# Patient Record
Sex: MALE | Race: WHITE
[De-identification: names, ages, dates, MRNs, and addresses within clinical notes are randomized per-mention and may not be internally consistent; named-entity substitution may affect disease eponyms.]

---

## 2017-05-07 ENCOUNTER — HOSPITAL ENCOUNTER (EMERGENCY)
Dept: HOSPITAL 25 - ED | Age: 27
Discharge: HOME | End: 2017-05-07
Payer: COMMERCIAL

## 2017-05-07 VITALS — DIASTOLIC BLOOD PRESSURE: 65 MMHG | SYSTOLIC BLOOD PRESSURE: 135 MMHG

## 2017-05-07 DIAGNOSIS — H11.421: Primary | ICD-10-CM

## 2017-05-07 PROCEDURE — 99281 EMR DPT VST MAYX REQ PHY/QHP: CPT

## 2017-05-07 NOTE — ED
Melvin CROCKETT Billy, scribed for Abel Thayer MD on 05/07/17 at 1259 .





Complex/Multi-Sys Presentation





- HPI Summary


HPI Summary: 


Patient is a 27 year-old male coming to [facility] for evaluation of itching to his 

right eye since this morning. He states that he was watching television when 

the symptoms began. He rubbed it due to the itch, and now his right eye is 

swelling and tearing. Denies any vision changes. He denies any obvious signs of 

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 He 

does not wear contact lenses. Patient has a history of seasonal allergies.





- History Of Current Complaint


Chief Complaint: EDEyeProblem


Time Seen by Provider: 05/07/17 12:51


Hx Obtained From: Patient


Onset/Duration: Sudden Onset, Lasting Hours, Still Present


Timing: Constant


Severity Currently: Moderate


Severity Initially: Moderate


Location: Pain At: - right eye


Aggravating Factor(s): none


Alleviating Factor(s): none





- Allergies/Home Medications


Allergies/Adverse Reactions: 


 Allergies











Allergy/AdvReac Type Severity Reaction Status Date / Time


 


No Known Allergies Allergy   Verified 05/07/17 13:23














PMH/Surg Hx/FS Hx/Imm Hx


Endocrine/Hematology History: 


   Denies: Hx Diabetes


Cardiovascular History: 


   Denies: Hx Coronary Artery Disease


Infectious Disease History: No


Infectious Disease History: 


   Denies: Traveled Outside the [place] in Last 30 Days





- Family History


Known Family History: 


   Negative: Cardiac Disease, Hypertension, Diabetes





- Social History


Alcohol Use: None


Hx Substance Use: No


Substance Use Type: Reports: None


Hx Tobacco Use: Yes


Type: Smokeless Tobacco





Review of Systems


Negative: Fever


Eyes: Other - right eye irritation


All Other Systems Reviewed And Are Negative: Yes





Physical Exam


Triage Information Reviewed: Yes


Vital Signs On Initial Exam: 


 Initial Vitals











Temp Pulse Resp BP Pulse Ox


 


 98.9 F   72   16   135/65   99 


 


 05/07/17 12:37  05/07/17 12:37  05/07/17 12:37  05/07/17 12:37  05/07/17 12:37











Vital Signs Reviewed: Yes


Appearance: Positive: Well-Appearing, No Pain Distress


Skin: Positive: Warm, Skin Color Reflects Adequate Perfusion, Dry


Head/Face: Positive: Normal Head/Face Inspection


Eyes: Positive: EOMI, ISAEL, Other: - In the right eye, there is conjunctival 

swelling of the lateral and inferior aspect which extends to the border of the 

iris. No FB or corneal abrasions seen with fluorescein dye.


ENT: Positive: Normal ENT inspection


Neck: Positive: Supple, Nontender


Musculoskeletal: Positive: Normal, Strength/ROM Intact


Neurological: Positive: Normal, Sensory/Motor Intact, Alert, Oriented to Person 

Place, Time


Psychiatric: Positive: Affect/Mood Appropriate





Diagnostics





- Vital Signs


 Vital Signs











  Temp Pulse Resp BP Pulse Ox


 


 05/07/17 12:37  98.9 F  72  16  135/65  99














- Laboratory


Lab Statement: Any lab studies that have been ordered have been reviewed, and 

results considered in the medical decision making process.





Complex Multi-Symp Course/Dx


Course Of Treatment: NO CRITICAL CARE TIME


Assessment/Plan: NO KNOWN FB. NO EVIDENCE OF PENETRATING INJURY. RX TOBREX AND 

KETOTIFEN. F/U WITH OPHTHALMOLOGY IF NOT IMPROVED. DISCHARGE HOME STABLE.





- Diagnoses


Provider Diagnoses: 


 Chemosis of right conjunctiva








Discharge





- Discharge Plan


Condition: Stable


Disposition: HOME


Prescriptions: 


Ketotifen Fumarate (Ophth) [Allergy Eye Drops] 1 drop OP BID PRN #1 bottle


 PRN Reason: Allergy Symptoms


Patient Education Materials:  Conjunctivitis (ED)


Referrals: 


No Primary Care Phys,NOPCP [Primary Care Provider] - 


[facility] [Provider Group]


DUANE JADE MD [Provider Group]


Additional Instructions: 


FOLLOW UP WITH OPHTHALMOLOGY IN 1-2 DAYS IF NOT IMPROVED OR WORSE.


RETURN TO THE EMERGENCY DEPARTMENT FOR ANY WORSENING OF YOUR CONDITION OR 

QUESTIONS OR CONCERNS.





The documentation as recorded by the Melvin singh Billy accurately reflects the 

service I personally performed and the decisions made by me, Abel Thayer MD.

## 2018-03-04 ENCOUNTER — HOSPITAL ENCOUNTER (EMERGENCY)
Dept: HOSPITAL 25 - ED | Age: 28
Discharge: ANOTHER HOSPITAL | End: 2018-03-04
Payer: COMMERCIAL

## 2018-03-04 VITALS — DIASTOLIC BLOOD PRESSURE: 68 MMHG | SYSTOLIC BLOOD PRESSURE: 122 MMHG
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
## 2018-03-04 DIAGNOSIS — S73.015A: Primary | ICD-10-CM
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
## 2018-03-04 LAB
BASOPHILS # BLD AUTO: 0.1 10^3/UL (ref 0–0.2)
EOSINOPHIL # BLD AUTO: 0 10^3/UL (ref 0–0.6)
HCT VFR BLD AUTO: 45 % (ref 42–52)
HGB BLD-MCNC: 15.5 G/DL (ref 14–18)
INR PPP/BLD: 0.87 (ref 0.77–1.02)
LYMPHOCYTES # BLD AUTO: 1.1 10^3/UL (ref 1–4.8)
MCH RBC QN AUTO: 31 PG (ref 27–31)
MCHC RBC AUTO-ENTMCNC: 35 G/DL (ref 31–36)
MCV RBC AUTO: 88 FL (ref 80–94)
MONOCYTES # BLD AUTO: 0.4 10^3/UL (ref 0–0.8)
NEUTROPHILS # BLD AUTO: 8.6 10^3/UL (ref 1.5–7.7)
NRBC # BLD AUTO: 0 10^3/UL
NRBC BLD QL AUTO: 0
PLATELET # BLD AUTO: 235 10^3/UL (ref 150–450)
RBC # BLD AUTO: 5.07 10^6/UL (ref 4–5.4)
WBC # BLD AUTO: 10.3 10^3/UL (ref 3.5–10.8)

## 2018-03-04 PROCEDURE — 85025 COMPLETE CBC W/AUTO DIFF WBC: CPT

## 2018-03-04 PROCEDURE — 71250 CT THORAX DX C-: CPT

## 2018-03-04 PROCEDURE — 83605 ASSAY OF LACTIC ACID: CPT

## 2018-03-04 PROCEDURE — G0480 DRUG TEST DEF 1-7 CLASSES: HCPCS

## 2018-03-04 PROCEDURE — 99285 EMERGENCY DEPT VISIT HI MDM: CPT

## 2018-03-04 PROCEDURE — 80320 DRUG SCREEN QUANTALCOHOLS: CPT

## 2018-03-04 PROCEDURE — 36415 COLL VENOUS BLD VENIPUNCTURE: CPT

## 2018-03-04 PROCEDURE — 74176 CT ABD & PELVIS W/O CONTRAST: CPT

## 2018-03-04 PROCEDURE — 85610 PROTHROMBIN TIME: CPT

## 2018-03-04 PROCEDURE — 96374 THER/PROPH/DIAG INJ IV PUSH: CPT

## 2018-03-04 PROCEDURE — 96375 TX/PRO/DX INJ NEW DRUG ADDON: CPT

## 2018-03-04 PROCEDURE — 70450 CT HEAD/BRAIN W/O DYE: CPT

## 2018-03-04 PROCEDURE — 80053 COMPREHEN METABOLIC PANEL: CPT
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
## 2018-08-02 ENCOUNTER — HOSPITAL ENCOUNTER (EMERGENCY)
Dept: HOSPITAL 25 - ED | Age: 28
Discharge: HOME | End: 2018-08-02
Payer: COMMERCIAL

## 2018-08-02 DIAGNOSIS — H10.12: ICD-10-CM
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
## 2018-08-02 DIAGNOSIS — J30.2: ICD-10-CM

## 2018-08-02 DIAGNOSIS — S05.02XA: Primary | ICD-10-CM

## 2018-08-02 PROCEDURE — 99281 EMR DPT VST MAYX REQ PHY/QHP: CPT
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
## 2018-08-02 NOTE — ED
Throat Pain/Nasal Congestion





- HPI Summary


HPI Summary: 


Pt. is a 28 y.o male who presents to the ER for left eye irritation x 2 days. 

Pt. states he noticed his left eye was itching, red and draining 2 days ago. He 

saw his PCP and was started on gentamycin drops. Pt. states his PCP thought 

there may be something in his eye but was able to remove it. Pt. does not wear 

contact lenses. Rubbing eye and blinking makes symptoms worse. Nothing makes 

symptoms better. Pt. has a hx of seasonal allergies, but is not currently 

taking an antihistamine. Pt. otherwise denies recent illness. Denies visual 

changes. 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 

when he was at home. Pt. presents to the ER today because symptoms are not 

improving.








- History of Current Complaint


Chief Complaint: EDEyeProblem


Time Seen by Provider: 08/02/18 06:24


Hx Obtained From: Patient





- Allergies/Home Medications


Allergies/Adverse Reactions: 


 Allergies











Allergy/AdvReac Type Severity Reaction Status Date / Time


 


No Known Allergies Allergy   Verified 03/04/18 19:50














PMH/Surg Hx/FS Hx/Imm Hx


Previously Healthy: Yes


Endocrine/Hematology History: 


   Denies: Hx Diabetes


Cardiovascular History: 


   Denies: Hx Coronary Artery Disease


Infectious Disease History: No


Infectious Disease History: 


   Denies: Traveled Outside the [place] in Last 30 Days





- Family History


Known Family History: 


   Negative: Cardiac Disease, Hypertension, Diabetes





- Social History


Occupation: Employed Full-time


Lives: With Family


Alcohol Use: Occasionally


Hx Substance Use: No


Substance Use Type: Reports: None


Hx Tobacco Use: Yes


Smoking Status (MU): Never Smoked Tobacco


Type: Smokeless Tobacco





Review of Systems


Constitutional: Negative


Positive: Drainage, Erythema


ENT: Negative


Cardiovascular: Negative


Respiratory: Negative


Skin: Negative


Neurological: Negative


All Other Systems Reviewed And Are Negative: Yes





Physical Exam


Triage Information Reviewed: Yes


Vital Signs On Initial Exam: 


 Initial Vitals











Temp Pulse Resp BP Pulse Ox


 


 97.6 F   48   16   156/106   100 


 


 08/02/18 06:19  08/02/18 06:19  08/02/18 06:19  08/02/18 06:19  08/02/18 06:19











Vital Signs Reviewed: Yes


Appearance: Positive: Well-Appearing - Pt. sitting on bed in NAD.


Skin: Positive: Warm, Dry


Head/Face: Positive: Normal Head/Face Inspection


Eyes: Positive: Other: - Right eye is unremarkable. Left eye is injected with 

mild edema to upper and lower eyelids. No periorbital erythema. No pain with 

EOM movement. Anterior chamber is clear.


ENT: Positive: Pharynx normal, Nasal congestion, TMs normal


Neck: Positive: Supple


Neurological: Positive: Normal, CN Intact II-III


Psychiatric: Positive: Affect/Mood Appropriate





Procedures





- Procedure Summary


Procedure Summary: 


Left eye was anesthetized with tetracaine and stained with fluorescein dye.  

Left eye was examined under Woods lamp.  There is a pinpoint area of uptake 

noted over the iris at 9:00 into the left lower conjunctiva.  Eyelid was 

everted and no foreign body noted.








Diagnostics





- Vital Signs


 Vital Signs











  Temp Pulse Resp BP Pulse Ox


 


 08/02/18 06:19  97.6 F  48  16  156/106  100














- Laboratory


Lab Statement: Any lab studies that have been ordered have been reviewed, and 

results considered in the medical decision making process.





EENT Course/Dx





- Course


Course Of Treatment: Pt. presenting for ongoing redness and irritation to left 

eye. He also has rhinorrhea.  He is afebrile and well appearing.  Initial blood 

pressure elevated.  We'll recheck.  No evidence of periorbital cellulitis.  He 

does have 2 small areas of uptake noted on exam.  Feel his symptoms are most 

likely more related to allergic conjunctivitis.  Advised him to continue 

allergy eye drops as directed.  Advised him to take an over-the-counter 

antihistamine.  Also advised him to get an over-the-counter antihistamine 

eyedrop such as opcon. To apply warm compresses. He was provided with oncall 

eye doctor for f.u if symptoms persist. To return to ER if sxs change or 

worsen. Pt. understands and agrees with plan.





- Differential Diagnoses


Differential Diagnoses: Allergic Rhinitis, Cellulitis, Conjunctivitis, Foreign 

Body, Keratitis





- Diagnoses


Provider Diagnoses: 


 Corneal abrasion, Allergic conjunctivitis, Seasonal allergic rhinitis








Discharge





- Sign-Out/Discharge


Documenting (check all that apply): Patient Departure





- Discharge Plan


Condition: Good


Disposition: HOME


Patient Education Materials:  Corneal Abrasion (ED), Conjunctivitis (ED)


Referrals: 


No Primary Care Phys,NOPCP [Primary Care Provider] - 


Audie Arora MD [Medical Doctor] - 


Additional Instructions: 


Schedule an appointment with Dr. Arora if symptoms persist


Continue antibiotic eye drops as directed


Take an over the counter antihistamine such as: Zyrtec


Use an over the counter allergy eye drop such as: Opcon-A


Apply warm compresses to eye


Return to ER if symptoms change or worsen





- Billing Disposition and Condition


Condition: GOOD


Disposition: Home

## 2018-08-02 NOTE — XMS REPORT
Arpit Ramon

 Created on:2018



Patient:Arpit Ramon

Sex:Male

:1990

External Reference #:[id number]





Demographics







 Address  [address] [place] [address]

 

 Home Phone  [phone]

 

 Preferred Language  English

 

 Marital Status  Not  Or 

 

 Mormon Affiliation  Unknown

 

 Race  White

 

 Ethnic Group  Not  Or 









Author







 Organization  [facility]

 

 Address  [address] [place] [address]/. [address]



   [place] [address]

 

 Phone  [phone]









Care Team Providers







 Name  Role  Phone

 

 Dalia Corrigan MD  Care Team Information   Unavailable

 

 [facility] MD  Primary Care Physician  Unavailable









Payers







 Type  Date  Identification Numbers  Payment Provider  Subscriber

 

 Commercial  Effective:  Policy Number: [id number]  BC/ Juaquin Robertso  Arpit Ramon



   2016      









 PayID: [id number]   [address] [id number]









 [place] [address]







Problems







 Description

 

 No Information







Social History







 Type  Date  Description  Comments

 

 Marital Status    Engaged  

 

 Occupation    Construction  

 

 Smokeless Tobacco    Current Smokeless Tobacco User, Uses 10 Times Daily  

 

 ETOH Use    Rarely consumes alcohol  

 

 Smoking    Chews Tobacco  

 

 Guns in Home    Yes, Locked Up  







Allergies, Adverse Reactions, Alerts







 Date  Description  Reaction  Status  Severity  Comments

 

 2018  NKDA    active    







Medications







 Medication  Date  Status  Form  Strength  Qnty  SIG  Indications  Ordering



                 Provider

 

 No Active  2018  Active            [facility]



 Medications                LINDA CAREY







Vital Signs







 Date  Vital  Result  Comment

 

 2018  BP Systolic  130 mmHg  









 BP Diastolic  80 mmHg  

 

 Height  68 inches  5'8"

 

 Weight  211.00 lb  

 

 BMI (Body Mass Index)  32.1 kg/m2  

 

 Heart Rate  68 /min  

 

 Respiratory Rate  16 /min  









 2018  BP Systolic  128 mmHg  









 BP Diastolic  80 mmHg  

 

 Height  68 inches  5'8"

 

 Weight  211.00 lb  

 

 BMI (Body Mass Index)  32.1 kg/m2  

 

 Heart Rate  76 /min  

 

 Respiratory Rate  16 /min  









 2016  BP Systolic  108 mmHg  









 BP Diastolic  78 mmHg  

 

 Height  68 inches  5'8"

 

 Weight  203.00 lb  

 

 BMI (Body Mass Index)  30.9 kg/m2  

 

 Heart Rate  89 /min  

 

 Respiratory Rate  20 /min  







Results







 Test  Date  Test  Result  H/L  Range  Note

 

 CBC Auto Diff  2018  White Blood Count  8.3 10^3/uL    3.5-10.8  1









 Red Blood Count  4.95 10^6/uL    4.00-5.40  1

 

 Hemoglobin  15.2 g/dL    14.0-18.0  1

 

 Hematocrit  44 %    42-52  1

 

 Mean Corpuscular Volume  89 fL    80-94  1

 

 Mean Corpuscular Hemoglobin  31 pg    27-31  1

 

 Mean Corpuscular HGB Conc  35 g/dL    31-36  1

 

 Red Cell Distribution Width  13 %    10.5-15  1

 

 Platelet Count  230 10^3/uL    150-450  1

 

 Mean Platelet Volume  8.4 um3    7.4-10.4  1

 

 Abs Neutrophils  5.5 10^3/uL    1.5-7.7  1

 

 Abs Lymphocytes  1.9 10^3/uL    1.0-4.8  1

 

 Abs Monocytes  0.7 10^3/uL    0-0.8  1

 

 Abs Eosinophils  0.1 10^3/uL    0-0.6  1

 

 Abs Basophils  0.1 10^3/uL    0-0.2  1

 

 Abs Nucleated RBC  0 10^3/uL      1

 

 Granulocyte %  66.4 %    38-83  1

 

 Lymphocyte %  22.9 %  Low  25-47  1

 

 Monocyte %  8.1 %  High  0-7  1

 

 Eosinophil %  1.8 %    0-6  1

 

 Basophil %  0.8 %    0-2  1

 

 Nucleated Red Blood Cells %  0.1      1









 Comp Metabolic Panel  2018  Sodium  138 mmol/L    135-145  1









 Potassium  4.0 mmol/L    3.5-5.0  1

 

 Chloride  102 mmol/L    101-111  1

 

 Co2 Carbon Dioxide  26 mmol/L    22-32  1

 

 Anion Gap  10 mmol/L    2-11  1

 

 Glucose  83 mg/dL      1

 

 Blood Urea Nitrogen  10 mg/dL    6-24  1

 

 Creatinine  1.23 mg/dL  High  0.67-1.17  1

 

 BUN/Creatinine Ratio  8.1    8-20  1

 

 Calcium  9.7 mg/dL    8.6-10.3  1

 

 Total Protein  7.2 g/dL    6.4-8.9  1

 

 Albumin  4.6 g/dL    3.2-5.2  1

 

 Globulin  2.6 g/dL    2-4  1

 

 Albumin/Globulin Ratio  1.8    1-3  1

 

 Total Bilirubin  0.70 mg/dL    0.2-1.0  1

 

 Alkaline Phosphatase  51 U/L      1

 

 Alt  21 U/L    7-52  1

 

 Ast  29 U/L    13-39  1

 

 Egfr Non-  69.6    >60  1

 

 Egfr   84.2    >60  1, 2









 Laboratory test finding  2018  TSH (Thyroid Stim  1.37 mcIU/mL    0.34-
5.60  1, 3



     Horm)        

 

 CBC Auto Diff  2016  White Blood Count  9.3 10^3/uL    3.5-10.8  









 Red Blood Count  5.14 10^6/uL    4.0-5.4  

 

 Hemoglobin  15.3 g/dL    14.0-18.0  

 

 Hematocrit  45 %    42-52  

 

 Mean Corpuscular Volume  87 fL    80-94  

 

 Mean Corpuscular Hemoglobin  30 pg    27-31  

 

 Mean Corpuscular HGB Conc  34 g/dL    31-36  

 

 Red Cell Distribution Width  13 %    10.5-15  

 

 Platelet Count  216 10^3/uL    150-450  

 

 Mean Platelet Volume  9 um3    7.4-10.4  

 

 Abs Neutrophils  5.7 10^3/uL    1.5-7.7  

 

 Abs Lymphocytes  2.6 10^3/uL    1.0-4.8  

 

 Abs Monocytes  0.7 10^3/uL    0-0.8  

 

 Abs Eosinophils  0.2 10^3/uL    0-0.6  

 

 Abs Basophils  0.1 10^3/uL    0-0.2  

 

 Abs Nucleated RBC  0 10^3/uL      

 

 Granulocyte %  61.1 %    38-83  

 

 Lymphocyte %  27.7 %    25-47  

 

 Monocyte %  7.2 %    1-9  

 

 Eosinophil %  2.5 %    0-6  

 

 Basophil %  1.5 %    0-2  

 

 Nucleated Red Blood Cells %  0      









 Comp Metabolic Panel  2016  Sodium  136 mmol/L    133-145  









 Potassium  4.0 mmol/L    3.5-5.0  

 

 Chloride  104 mmol/L    101-111  

 

 Co2 Carbon Dioxide  26 mmol/L    22-32  

 

 Anion Gap  6 mmol/L    2-11  

 

 Glucose  95 mg/dL      

 

 Blood Urea Nitrogen  12 mg/dL    6-24  

 

 Creatinine  1.08 mg/dL    0.67-1.17  

 

 BUN/Creatinine Ratio  11.1    8-20  

 

 Calcium  9.4 mg/dL    8.6-10.3  

 

 Total Protein  7.2 g/dL    6.4-8.9  

 

 Albumin  4.4 g/dL    3.2-5.2  

 

 Globulin  2.8 g/dL    2-4  

 

 Albumin/Globulin Ratio  1.6    1-3  

 

 Total Bilirubin  0.60 mg/dL    0.2-1.0  

 

 Alkaline Phosphatase  52 U/L      

 

 Alt  17 U/L    7-52  

 

 Ast  16 U/L    13-39  

 

 Egfr Non-  82.0    >60  

 

 Egfr   105.5    >60  4









 Laboratory test  2016  Hemoglobin A1c (Glyco  5.7 %    Less than 6.0  5



 finding    HGB)        

 

 Lipid Profile  2016  Triglycerides  168 mg/dL      6



 (Trig/Chol/HDL)            









 Cholesterol  179 mg/dL      7

 

 HDL Cholesterol  40.7 mg/dL      8

 

 LDL Cholesterol  105 mg/dL      9









 Laboratory test finding  2016  TSH (Thyroid Stim  1.06 mcIU/mL    0.34-
5.60  10



     Horm)        









 1  [id number]

 

 2  *******Because ethnic data is not always readily available,



   this report includes an eGFR for both -Americans and



   non- Americans.****



   The National Kidney Disease Education Program (NKDEP) does



   not endorse the use of the MDRD equation for patients that



   are not between the ages of 18 and 70, are pregnant, have



   extremes of body size, muscle mass, or nutritional status,



   or are non- or non-.



   According to the National Kidney Foundation, irrespective of



   diagnosis, the stage of the disease is based on the level of



   kidney function:



   Stage Description                      GFR(mL/min/1.73 m(2))



   1     Kidney damage with normal or decreased GFR       90



   2     Kidney damage with mild decrease in GFR          60-89



   3     Moderate decrease in GFR                         30-59



   4     Severe decrease in GFR                           15-29



   5     Kidney failure                       <15 (or dialysis)

 

 3  [id number]

 

 4  *******Because ethnic data is not always readily available,



   this report includes an eGFR for both -Americans and



   non- Americans.****



   The National Kidney Disease Education Program (NKDEP) does



   not endorse the use of the MDRD equation for patients that



   are not between the ages of 18 and 70, are pregnant, have



   extremes of body size, muscle mass, or nutritional status,



   or are non- or non-.



   According to the National Kidney Foundation, irrespective of



   diagnosis, the stage of the disease is based on the level of



   kidney function:



   Stage Description                      GFR(mL/min/1.73 m(2))



   1     Kidney damage with normal or decreased GFR       90



   2     Kidney damage with mild decrease in GFR          60-89



   3     Moderate decrease in GFR                         30-59



   4     Severe decrease in GFR                           15-29



   5     Kidney failure                       <15 (or dialysis)

 

 5  Therapeutic target for the treatment of diabetes



   [place] patients is <7% HBA1C, and in selective



   patients <6.0%.Please refer to American Diabetes



   Association Diabetic care guidelines for further



   information.

 

 6  Desirable <150



   Borderline high 150-199



   High 200-499



   Very High >500

 

 7  Desirable <200



   Borderline high 200-239



   High >239

 

 8  Low <40



   Desirable: 40-60



   High: >60

 

 9  Desirable: <100 mg/dL



   Near Optimal: 100-129 mg/dL



   Borderline High: 130-159 mg/dL



   High: 160-189 mg/dL



   Very High: >189 mg/dL

 

 10  [id number]







Procedures







 Date  CPT Code  Description  Status

 

 2018  36736  Visual Screening Test  Completed

 

 2018  26665  Audiometry, Bekesy, Screening  Completed

 

 2016  95598  Visual Screening Test  Completed

 

 2016  67817  Audiometry, Bekesy, Screening  Completed







Encounters







 Type  Date  Location  Provider  CPT E/M  Dx

 

 Office Visit  2018  3:30p  [facility]  [facility]rigan M.D.  [address]  
Z00.01









 L20.9

 

 J30.9

 

 D17.1

 

 F17.220

 

 Z30.9

 

 Z68.32









 Office Visit  2016  3:00p  [facility]  [facility]  [address]  Z00.01









 Z00.01

 

 D17.1







Plan of Care

2018 - Abel Hatfield N.P.L20.9 Atopic dermatitis, unspecifiedComments:
SKIN CARE INSTRUCTIONS  LOTION OR BABY OIL 2-3  APPLICATION PER DAYUSE 
MOISTURIZING SOAPAVOID PROLONGED WATER EXPOSUREAVOID USING HOT WATER IN 
AQHDXDM82.9 Allergic rhinitis, unspecifiedComments:INCREASE PO FLUID USE 
ANTIHISTAMINE PRN SECOND HAND SMOKING AVOIDANCED17.1 Benign lipomatous neoplasm 
of skin, subcu of trunkComments:JUFHZZMW75.220 Nicotine dependence, chewing 
tobacco, uncomplicatedComments:TOBACCO CHEWING  CESSATION [id number] 
Chronic kidney disease, stage 2 (mild)Comments:DIET REVIEWED MEDICATION 
REVIEWEDF/U LABFollow up:6 months. FOR FBW
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
## 2019-01-07 ENCOUNTER — HOSPITAL ENCOUNTER (EMERGENCY)
Dept: HOSPITAL 25 - ED | Age: 29
Discharge: HOME | End: 2019-01-07
Payer: COMMERCIAL

## 2019-01-07 VITALS — SYSTOLIC BLOOD PRESSURE: 119 MMHG | DIASTOLIC BLOOD PRESSURE: 69 MMHG

## 2019-01-07 DIAGNOSIS — J10.1: Primary | ICD-10-CM

## 2019-01-07 PROCEDURE — 99282 EMERGENCY DEPT VISIT SF MDM: CPT

## 2019-01-07 NOTE — ED
Influenza-Like Illness





- HPI Summary


HPI Summary: 





This patient is a 28 year old M presenting to [facility] with a chief complaint of 

fever and body aches since last night. He states he had chills last night 

before bed and woke up at 0200 with a fever of 106.1. He states tonight his 

temperature was 106.4 at 1930. He states he feels like he just had a difficult 

workout. The patient reports coughing, and congestion. He denies nausea and 

vomiting. He denies having a flu-shot this year. 





- History of Current Complaint


Chief Complaint: EDFever


Hx Obtained From: Patient


Onset/Duration: Lasting Days, Still Present


Severity: Moderate


Associated Signs & Symptoms: Fever, Cough, Nasal Congestion





- Allergy/Home Medications


Allergies/Adverse Reactions: 


 Allergies











Allergy/AdvReac Type Severity Reaction Status Date / Time


 


No Known Allergies Allergy   Verified 01/07/19 19:53














PMH/Surg Hx/FS Hx/Imm Hx


Endocrine/Hematology History: 


   Denies: Hx Diabetes


Cardiovascular History: 


   Denies: Hx Coronary Artery Disease


Infectious Disease History: No


Infectious Disease History: 


   Denies: Traveled Outside the [place] in Last 30 Days





- Family History


Known Family History: 


   Negative: Cardiac Disease, Hypertension, Diabetes





- Social History


Alcohol Use: Occasionally


Hx Substance Use: No


Substance Use Type: Reports: None


Hx Tobacco Use: Yes


Smoking Status (MU): Never Smoked Tobacco


Type: Smokeless Tobacco





Review of Systems


Positive: Fever, Chills


Positive: Nasal Discharge - Congestion


Positive: Other - Body aches


All Other Systems Reviewed And Are Negative: Yes





Physical Exam





- Summary


Physical Exam Summary: 








VITAL SIGNS: Reviewed.


GENERAL:  Patient is a well-developed and nourished NALE who is lying 

comfortable in the stretcher. Patient is not in any acute respiratory distress. 

Patient does not appear toxic, appears healthy. 


HEAD AND FACE: No signs of trauma. No ecchymosis, hematomas or skull 

depressions. No sinus tenderness.


EYES: PERRLA, EOMI x 2, No injected conjunctiva, no nystagmus.


EARS: Hearing grossly intact. Ear canals and tympanic membranes are within 

normal limits.


MOUTH: Oropharynx within normal limits.


NECK: Supple, trachea is midline, no adenopathy, no JVD, no carotid bruit, no c-

spine tenderness, neck with full ROM.


CHEST: Symmetric, no tenderness at palpation


LUNGS: Clear to auscultation bilaterally. No wheezing or crackles.


CVS: Regular rate and rhythm, S1 and S2 present, no murmurs or gallops 

appreciated.


ABDOMEN: Soft, non-tender. No signs of distention. No rebound no guarding, and 

no masses palpated. Bowel sounds are normal.


EXTREMITIES: FROM in all major joints, no edema, no cyanosis or clubbing.


NEURO: Alert and oriented x 3. No acute neurological deficits. Speech is normal 

and follows commands.


SKIN: Dry and warm


 





Triage Information Reviewed: Yes


Vital Signs On Initial Exam: 


 Initial Vitals











Temp Pulse Resp BP Pulse Ox


 


 101.7 F   104   16   138/77   96 


 


 01/07/19 19:51  01/07/19 19:51  01/07/19 19:51  01/07/19 19:51  01/07/19 19:51











Vital Signs Reviewed: Yes





Diagnostics





- Vital Signs


 Vital Signs











  Temp Pulse Resp BP Pulse Ox


 


 01/07/19 19:51  101.7 F  104  16  138/77  96














- Laboratory


Lab Statement: Any lab studies that have been ordered have been reviewed, and 

results considered in the medical decision making process.





Flu Symptom Course/Dx





- Course


Course Of Treatment: This patient is a 28 year old M presenting to [facility] with a 

chief complaint of fever and body aches since last night. He denies having a flu

-shot this year. Rapid Influenza A test was remarkable for Influenza A. A plan 

for discharge was discussed with the patient and he was a agreeable with this 

plan.





- Diagnoses


Provider Diagnoses: 


 Influenza








Discharge





- Sign-Out/Discharge


Documenting (check all that apply): Patient Departure - Discharge





- Discharge Plan


Condition: Stable


Disposition: HOME


Prescriptions: 


Ibuprofen TAB* [Motrin TAB* 800 MG] 800 mg PO Q6H PRN #30 tab


 PRN Reason: Fever/Pain


Oseltamivir CAP* [Tamiflu CAP*] 75 mg PO BID #10 cap


Patient Education Materials:  Influenza (ED)


Forms:  *Work Release


Referrals: 


No Primary Care Phys,NOPCP [Primary Care Provider] - 


Additional Instructions: 


Return to ED with any new or worsening symptoms. 





- Attestation Statements


Document Initiated by Scribe: Yes


Documenting Scribe: Job Ward


Provider For Whom Scribe is Documenting (Include Credential): Raman Montgomery MD


Scribe Attestation: 


Job CROCKETT, terranceibed for Raman Montgomery MD on 01/07/19 at 2055. 


Status of Scribe Document: Ready

## 2020-02-09 ENCOUNTER — HOSPITAL ENCOUNTER (EMERGENCY)
Dept: HOSPITAL 25 - ED | Age: 30
Discharge: HOME | End: 2020-02-09
Payer: COMMERCIAL

## 2020-02-09 VITALS — SYSTOLIC BLOOD PRESSURE: 115 MMHG | DIASTOLIC BLOOD PRESSURE: 86 MMHG

## 2020-02-09 DIAGNOSIS — J10.1: Primary | ICD-10-CM

## 2020-02-09 LAB — FLUBV RNA SPEC QL NAA+PROBE: POSITIVE

## 2020-02-09 PROCEDURE — 99282 EMERGENCY DEPT VISIT SF MDM: CPT

## 2020-02-09 NOTE — ED
Influenza-Like Illness





- HPI Summary


HPI Summary: 


This patient is a 28yo M presenting to the ED with head congestion, chest 

congestion, fever, and cough since last night.  He states his daughter had a 

confirmed flu B+ last week.  He is also stating his infant is also sick and 

currently at [facility].  He has been taking Tylenol and ibuprofen since last 

evening when he first became sick.  This with some improvement.  He has a cough

, however without production.


Denies any diarrhea, constipation, nausea, vomiting.


Fever at home  - approximately 101.





- History of Current Complaint


Chief Complaint: EDFluSymptoms


Time Seen by Provider: 02/09/20 11:52


Hx Obtained From: Patient


Onset/Duration: Sudden Onset


Severity: Moderate


Associated Signs & Symptoms: Fever, T Max - 102, F/C, Myalgia, Cough, Nasal 

Congestion, Headache





- Risk Factors


Influenza Risk Factors: Negative





- Allergy/Home Medications


Allergies/Adverse Reactions: 


 Allergies











Allergy/AdvReac Type Severity Reaction Status Date / Time


 


No Known Allergies Allergy   Verified 02/09/20 11:25














PMH/Surg Hx/FS Hx/Imm Hx


Previously Healthy: Yes


Endocrine/Hematology History: 


   Denies: Hx Diabetes


Cardiovascular History: 


   Denies: Hx Coronary Artery Disease





- Immunization History


Hx Pertussis Vaccination: No


Immunizations Up to Date: Yes


Infectious Disease History: No


Infectious Disease History: 


   Denies: Traveled Outside the [place] in Last 30 Days





- Family History


Known Family History: 


   Negative: Cardiac Disease, Hypertension, Diabetes





- Social History


Occupation: Employed Full-time


Lives: With Family


Alcohol Use: Occasionally


Hx Substance Use: No


Substance Use Type: Reports: None


Hx Tobacco Use: Yes


Smoking Status (MU): Never Smoked Tobacco


Type: Smokeless Tobacco





Review of Systems


Positive: Fever, Chills, Fatigue, Skin Diaphoresis


Negative: Palpitations, Chest Pain


Positive: Cough.  Negative: Shortness Of Breath


Genitourinary: Negative


Positive: no symptoms reported, see HPI


Positive: Myalgia.  Negative: Arthralgia


Neurological: Negative


All Other Systems Reviewed And Are Negative: Yes





Physical Exam


Triage Information Reviewed: Yes


Vital Signs On Initial Exam: 


 Initial Vitals











Temp Pulse Resp BP Pulse Ox


 


 100.0 F   90   19   127/83   96 


 


 02/09/20 11:23  02/09/20 11:23  02/09/20 11:23  02/09/20 11:23  02/09/20 11:23











Vital Signs Reviewed: Yes


Appearance: Positive: Well-Nourished


Skin: Positive: Warm, Skin Color Reflects Adequate Perfusion


Head/Face: Positive: Normal Head/Face Inspection


Eyes: Positive: EOMI, ISAEL, Conjunctiva Clear


Neck: Positive: Supple, No Lymphadenopathy


Respiratory/Lung Sounds: Positive: Clear to Auscultation, Breath Sounds Present


Cardiovascular: Positive: RRR, Pulses are Symmetrical in both Upper and Lower 

Extremities


Musculoskeletal: Positive: Normal, Strength/ROM Intact


Neurological: Positive: Sensory/Motor Intact, Speech Normal


Psychiatric: Positive: Normal, Affect/Mood Appropriate





Procedures





- Sedation


Patient Received Moderate/Deep Sedation with Procedure: No





Diagnostics





- Vital Signs


 Vital Signs











  Temp Pulse Resp BP Pulse Ox


 


 02/09/20 12:06  99.8 F  94  16  115/86  95


 


 02/09/20 11:23  100.0 F  90  19  127/83  96














- Laboratory


Lab Results: 


 Lab Results











  02/09/20 Range/Units





  11:25 


 


Influenza A (Rapid)  Not Reportable  


 


Influenza B (Rapid)  Positive A  (Negative)  











Lab Statement: Any lab studies that have been ordered have been reviewed, and 

results considered in the medical decision making process.





Flu Symptom Course/Dx





- Course


Course Of Treatment: This patient appears well.  Nondiaphoretic and nontoxic in 

appearing.  Vital signs are stable.  Temperature 100.0, rechecked at 99.8.  

Influenza B positive.  Pt has been taking tylenol and ibuprofen.  The symptoms 

had presented since this morning.  Patient will be given Tamiflu 75 mg twice 

daily 5 days.





- Diagnoses


Differential Diagnosis/HQI/PQRI: Positive: Bronchitis, Influenza, Pneumonia


Provider Diagnoses: 


 Influenza








Discharge ED





- Sign-Out/Discharge


Documenting (check all that apply): Patient Departure





- Discharge Plan


Condition: Stable


Disposition: HOME


Prescriptions: 


Oseltamivir CAP* [Tamiflu CAP*] 75 mg PO BID #10 cap


Patient Education Materials:  Influenza (ED)


Referrals: 


No Primary Care Phys,NOPCP [Primary Care Provider] - 


Additional Instructions: 


Tylenol and ibuprofen every 3 hours intermittently


Tamiflu twice daily x 5 days


Drink plenty of fluids


Rest





- Billing Disposition and Condition


Condition: STABLE


Disposition: Home